# Patient Record
Sex: FEMALE | Race: WHITE | Employment: FULL TIME | URBAN - METROPOLITAN AREA
[De-identification: names, ages, dates, MRNs, and addresses within clinical notes are randomized per-mention and may not be internally consistent; named-entity substitution may affect disease eponyms.]

---

## 2020-06-09 ENCOUNTER — TELEPHONE (OUTPATIENT)
Dept: FAMILY MEDICINE CLINIC | Age: 40
End: 2020-06-09

## 2020-06-09 NOTE — TELEPHONE ENCOUNTER
I left a generic voicemail to call us back. She can do a two-factor verification of who she is to get information, or she can complete a release form to have immunization records sent on her behalf.

## 2020-06-09 NOTE — TELEPHONE ENCOUNTER
----- Message from Anish Morales sent at 6/9/2020  7:38 AM EDT -----  Regarding: Dr. Cammy Owusu Message/Vendor Calls    Caller's first and last name: Noni Brady       Reason for call: Pt needs a copy of vaccine record. She needs to know when she received a hep b vaccine.        Callback required yes/no and why: yes       Best contact number(s):[phone]       Details to clarify the request: Pt need before Friday       [facility] Clinic[facility]

## 2020-06-10 NOTE — TELEPHONE ENCOUNTER
Chava/telephone   Received: Today   Message Contents   [address]   Phone Number: [phone]             Caller's first and last name and relationship (if not the patient): n/a   Best contact number(s): [phone] fax: [phone]   Whose call is being returned: Salomon Chanel   Details to clarify the request: This is regarding her Hep B vaccination.  Pt last seen in 2016